# Patient Record
Sex: MALE | Race: WHITE | NOT HISPANIC OR LATINO | URBAN - METROPOLITAN AREA
[De-identification: names, ages, dates, MRNs, and addresses within clinical notes are randomized per-mention and may not be internally consistent; named-entity substitution may affect disease eponyms.]

---

## 2020-08-04 ENCOUNTER — OFFICE VISIT (OUTPATIENT)
Dept: URBAN - METROPOLITAN AREA CLINIC 97 | Facility: CLINIC | Age: 50
End: 2020-08-04
Payer: COMMERCIAL

## 2020-08-04 DIAGNOSIS — K51.80 OTHER ULCERATIVE COLITIS WITHOUT COMPLICATION: ICD-10-CM

## 2020-08-04 PROCEDURE — 96413 CHEMO IV INFUSION 1 HR: CPT | Performed by: INTERNAL MEDICINE

## 2020-08-04 PROCEDURE — 96415 CHEMO IV INFUSION ADDL HR: CPT | Performed by: INTERNAL MEDICINE

## 2020-08-04 RX ORDER — INFLIXIMAB 100 MG/10ML
INJECTION, POWDER, LYOPHILIZED, FOR SOLUTION INTRAVENOUS
Qty: 0 | Refills: 0 | Status: ACTIVE | COMMUNITY
Start: 1900-01-01 | End: 1900-01-01

## 2020-08-04 RX ORDER — ADALIMUMAB 40MG/0.8ML
INJECT 0.8 MILLILITER BY SUBCUTANEOUS ROUTE EVERY 2 WEEKS FOR 90 DAYS KIT SUBCUTANEOUS
Qty: 6 | Refills: 1 | Status: ACTIVE | COMMUNITY
Start: 2017-10-18 | End: 1900-01-01

## 2020-08-04 RX ORDER — FAMOTIDINE 20 MG/1
TAKE 1 TABLET (20 MG) BY ORAL ROUTE ONCE DAILY AT BEDTIME FOR 30 DAYS TABLET, FILM COATED ORAL 1
Qty: 30 | Refills: 2 | Status: ACTIVE | COMMUNITY
Start: 2017-08-11 | End: 1900-01-01

## 2020-09-14 ENCOUNTER — OFFICE VISIT (OUTPATIENT)
Dept: URBAN - METROPOLITAN AREA CLINIC 79 | Facility: CLINIC | Age: 50
End: 2020-09-14

## 2020-09-17 ENCOUNTER — OFFICE VISIT (OUTPATIENT)
Dept: URBAN - METROPOLITAN AREA CLINIC 79 | Facility: CLINIC | Age: 50
End: 2020-09-17

## 2020-09-29 ENCOUNTER — TELEPHONE ENCOUNTER (OUTPATIENT)
Dept: URBAN - METROPOLITAN AREA CLINIC 18 | Facility: CLINIC | Age: 50
End: 2020-09-29

## 2020-09-29 ENCOUNTER — OFFICE VISIT (OUTPATIENT)
Dept: URBAN - METROPOLITAN AREA CLINIC 18 | Facility: CLINIC | Age: 50
End: 2020-09-29
Payer: COMMERCIAL

## 2020-09-29 DIAGNOSIS — K51.80 CHRONIC PANCOLONIC ULCERATIVE COLITIS: ICD-10-CM

## 2020-09-29 PROCEDURE — 96413 CHEMO IV INFUSION 1 HR: CPT | Performed by: INTERNAL MEDICINE

## 2020-09-29 PROCEDURE — 96415 CHEMO IV INFUSION ADDL HR: CPT | Performed by: INTERNAL MEDICINE

## 2020-09-29 RX ORDER — FAMOTIDINE 20 MG/1
TAKE 1 TABLET (20 MG) BY ORAL ROUTE ONCE DAILY AT BEDTIME FOR 30 DAYS TABLET, FILM COATED ORAL 1
Qty: 30 | Refills: 2 | Status: ACTIVE | COMMUNITY
Start: 2017-08-11 | End: 1900-01-01

## 2020-09-29 RX ORDER — ADALIMUMAB 40MG/0.8ML
INJECT 0.8 MILLILITER BY SUBCUTANEOUS ROUTE EVERY 2 WEEKS FOR 90 DAYS KIT SUBCUTANEOUS
Qty: 6 | Refills: 1 | Status: ACTIVE | COMMUNITY
Start: 2017-10-18 | End: 1900-01-01

## 2020-09-29 RX ORDER — INFLIXIMAB 100 MG/10ML
INJECTION, POWDER, LYOPHILIZED, FOR SOLUTION INTRAVENOUS
Qty: 0 | Refills: 0 | Status: ACTIVE | COMMUNITY
Start: 1900-01-01 | End: 1900-01-01

## 2020-09-30 ENCOUNTER — CLAIMS CREATED FROM THE CLAIM WINDOW (OUTPATIENT)
Dept: URBAN - METROPOLITAN AREA CLINIC 19 | Facility: CLINIC | Age: 50
End: 2020-09-30
Payer: COMMERCIAL

## 2020-09-30 ENCOUNTER — WEB ENCOUNTER (OUTPATIENT)
Dept: URBAN - METROPOLITAN AREA CLINIC 19 | Facility: CLINIC | Age: 50
End: 2020-09-30

## 2020-09-30 DIAGNOSIS — E55.9 VITAMIN D DEFICIENCY: ICD-10-CM

## 2020-09-30 DIAGNOSIS — K51.00 ULCERATIVE PANCOLITIS WITHOUT COMPLICATION: ICD-10-CM

## 2020-09-30 PROCEDURE — 82306 VITAMIN D 25 HYDROXY: CPT | Performed by: INTERNAL MEDICINE

## 2020-09-30 PROCEDURE — 99213 OFFICE O/P EST LOW 20 MIN: CPT | Performed by: INTERNAL MEDICINE

## 2020-09-30 RX ORDER — ADALIMUMAB 40MG/0.8ML
INJECT 0.8 MILLILITER BY SUBCUTANEOUS ROUTE EVERY 2 WEEKS FOR 90 DAYS KIT SUBCUTANEOUS
Qty: 6 | Refills: 1 | Status: ACTIVE | COMMUNITY
Start: 2017-10-18 | End: 1900-01-01

## 2020-09-30 RX ORDER — FAMOTIDINE 20 MG/1
TAKE 1 TABLET (20 MG) BY ORAL ROUTE ONCE DAILY AT BEDTIME FOR 30 DAYS TABLET, FILM COATED ORAL 1
Qty: 30 | Refills: 2 | Status: ACTIVE | COMMUNITY
Start: 2017-08-11 | End: 1900-01-01

## 2020-09-30 RX ORDER — SODIUM, POTASSIUM,MAG SULFATES 17.5-3.13G
254 ML SOLUTION, RECONSTITUTED, ORAL ORAL ONCE
Qty: 1 KIT | Refills: 0 | OUTPATIENT
Start: 2020-09-30 | End: 2020-10-01

## 2020-09-30 RX ORDER — INFLIXIMAB 100 MG/10ML
INJECTION, POWDER, LYOPHILIZED, FOR SOLUTION INTRAVENOUS
Qty: 0 | Refills: 0 | Status: ACTIVE | COMMUNITY
Start: 1900-01-01 | End: 1900-01-01

## 2020-09-30 NOTE — HPI-TODAY'S VISIT:
10/18/17: Patient's repeat C. difficile testing on 9/7/17 was negative. He did not undergo an EGD, but he feels better from that standpoint. He is still having 2-3 loose stools/day without blood or mucus. No cramping. We discussed increasing his Humira to weekly dosing for better control of symptoms. He also complains of hand and leg stiffness but not actual joint pain or swelling. This is new.   4/11/18: Patient returns to my clinic for reevaluation of his ulcerative colitis. Because of upper respiratory infections (for which he eventually got antibiotics), he has been unable to schedule his repeat colonoscopy to evaluate for mucosal healing. In February 2018, he received a Z-david followed by cefdinir along with prednisone. He apparently had a reaction to one of the antibiotics causing abdominal cramping and a rash. His stools are still loose but non-bloody; he has 2-3 BMs/day. Has episodic palpitations and night sweats. Still taking Enteragam/VSL #3 and is tapering off his prednisone. Of note, he had an episode of atrial fibrillation that spontaneously resolved - he has been evaluated by cardiology. No need for anticoagulation. Labs from 4/9/18 were reviewed: Hgb 8.0/Hct 27.8 (MCV 65), Plt 824, ESR 30, CRP 40.1, vit D 16.3 6/26/18: Patient returns for follow-up after receiving his induction doses of Remicade. Feeling more energy. Less bowel movements (still loose). No cramping. No blood.  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 Patient feels well today after receiving his Remicade infusion recently. Minimal cramping. No blood. Stools not quite formed ([place] 5 to 7, closer to 5) but only 2-3 BMs/day. Has some stiffness of hands and back but no true arthritis.   1/30/19: Patient returns for follow-up. He has a sinus infection and finally went to his ENT after symptoms had been unimproved for over a month. He is currently on Medrol and antibiotics - his sinuses are better. No apparent GI problems - he has 1 BM/day of relatively normal consistency. No blood. Still on Remicade - holding that while he treats his sinus infection. Will restart when feeling better. On vitamin D and iron.   5/28/19: Patient returns for follow-up of UC. Restarted his Remicade infusions - last one done on 4/25/19. Still takes VSL #3 DS. Doing well - has one BM/day. No blood or cramping. Only concerns are some inconsistency with the stool (somewhat loose) and dyspnea when climbing stairs. He has gained weight - suspect some element of deconditioning.   6/12/19:  pt comes in for recent hematochezia with dropping hct.   notes recent issues with brown stool with brb on it.  he also notes labs done 2 weeks ago with hgb down from 12.9 --> 12.3.  he notes mostwly normal stools with no urgency.  he has 1 bm every 1-2 days.  he notes some brb on the stool. no abd pain.  no rectal pain.  no n/v.  good appetite.  no weight loss.  no f/c.  notes some thinner stools.  no other complaints.   8/8/19: Patient presents for follow-up. He is concerned about anemia - his ferritin was 9 when checked by Dr. Jack. Iron infusion was ordered, and he received his first infusion last week and another one this week. Has some dyspnea when he walks up stairs or gets up from lying/sitting down. Has gained some weight - suspect some deconditioning, but anemia may be playing a small role. He has bouts of loose stools every few weeks - possibly sees small amounts of blood. Overall, doing well.   11/8/19: Patient presents for follow-up of his ulcerative colitis. At his last visit, I checked labs, and his blood counts and iron studies/ferritin were normal (hgb 13.8 g/dL, ferritin > 300). Still has low vitamin D level - on vitamin D supplement. Last colonoscopy was on 4/30/18 - showed severe disease but unchanged. He is doing well today - no pain (except for some gas after eating cabbage). No blood in stool or diarrhea.  3/16/20: Patient presents for follow-up. Doing well on Remicade. He has had 2 to 3 episodes of cramping, but this occurred when he ate a lot of sweets after dieting. He has not seen any blood in stool. He has felt his heart racing, and his HR is 100 today. Will check labs.  9/30/20:  Patient returns for reevaluation of his UC.  He mentioned that he still has episodic cramping after eating a lot of sweets, but this only happened 3 times since the beginning of the year.  No bloody diarrhea or extraintestinal manifestations of his disease.  Overall doing well.

## 2020-10-01 LAB
A/G RATIO: 1.6
ALBUMIN: 4.3
ALKALINE PHOSPHATASE: 92
ALT (SGPT): 61
AST (SGOT): 49
BILIRUBIN, TOTAL: 0.4
BUN/CREATININE RATIO: 15
BUN: 15
CALCIUM: 9.7
CARBON DIOXIDE, TOTAL: 22
CHLORIDE: 103
CREATININE: 0.99
EGFR IF AFRICN AM: 103
EGFR IF NONAFRICN AM: 89
GLOBULIN, TOTAL: 2.7
GLUCOSE: 119
HEMATOCRIT: 42.6
HEMOGLOBIN: 14.1
MCH: 27.3
MCHC: 33.1
MCV: 82
NRBC: (no result)
PLATELETS: 489
POTASSIUM: 4.4
PROTEIN, TOTAL: 7
RBC: 5.17
RDW: 13.8
SODIUM: 138
VITAMIN D, 25-HYDROXY: 26
WBC: 5.2

## 2020-10-04 ENCOUNTER — TELEPHONE ENCOUNTER (OUTPATIENT)
Dept: URBAN - METROPOLITAN AREA CLINIC 18 | Facility: CLINIC | Age: 50
End: 2020-10-04

## 2020-10-04 ENCOUNTER — LAB OUTSIDE AN ENCOUNTER (OUTPATIENT)
Dept: URBAN - METROPOLITAN AREA CLINIC 18 | Facility: CLINIC | Age: 50
End: 2020-10-04

## 2020-11-24 ENCOUNTER — OFFICE VISIT (OUTPATIENT)
Dept: URBAN - METROPOLITAN AREA CLINIC 79 | Facility: CLINIC | Age: 50
End: 2020-11-24
Payer: COMMERCIAL

## 2020-11-24 VITALS
HEIGHT: 73 IN | SYSTOLIC BLOOD PRESSURE: 104 MMHG | WEIGHT: 236 LBS | RESPIRATION RATE: 18 BRPM | BODY MASS INDEX: 31.28 KG/M2 | DIASTOLIC BLOOD PRESSURE: 67 MMHG | HEART RATE: 76 BPM | TEMPERATURE: 98.2 F

## 2020-11-24 DIAGNOSIS — K51.80 CHRONIC PANCOLONIC ULCERATIVE COLITIS: ICD-10-CM

## 2020-11-24 PROCEDURE — 96415 CHEMO IV INFUSION ADDL HR: CPT | Performed by: INTERNAL MEDICINE

## 2020-11-24 PROCEDURE — 96413 CHEMO IV INFUSION 1 HR: CPT | Performed by: INTERNAL MEDICINE

## 2020-11-24 RX ORDER — ADALIMUMAB 40MG/0.8ML
INJECT 0.8 MILLILITER BY SUBCUTANEOUS ROUTE EVERY 2 WEEKS FOR 90 DAYS KIT SUBCUTANEOUS
Qty: 6 | Refills: 1 | Status: ACTIVE | COMMUNITY
Start: 2017-10-18 | End: 1900-01-01

## 2020-11-24 RX ORDER — INFLIXIMAB 100 MG/10ML
INJECTION, POWDER, LYOPHILIZED, FOR SOLUTION INTRAVENOUS
Qty: 0 | Refills: 0 | Status: ACTIVE | COMMUNITY
Start: 1900-01-01 | End: 1900-01-01

## 2020-11-24 RX ORDER — FAMOTIDINE 20 MG/1
TAKE 1 TABLET (20 MG) BY ORAL ROUTE ONCE DAILY AT BEDTIME FOR 30 DAYS TABLET, FILM COATED ORAL 1
Qty: 30 | Refills: 2 | Status: ACTIVE | COMMUNITY
Start: 2017-08-11 | End: 1900-01-01

## 2020-12-16 ENCOUNTER — OFFICE VISIT (OUTPATIENT)
Dept: URBAN - METROPOLITAN AREA SURGERY CENTER 31 | Facility: SURGERY CENTER | Age: 50
End: 2020-12-16

## 2021-01-13 ENCOUNTER — OFFICE VISIT (OUTPATIENT)
Dept: URBAN - METROPOLITAN AREA SURGERY CENTER 31 | Facility: SURGERY CENTER | Age: 51
End: 2021-01-13

## 2021-01-19 ENCOUNTER — OFFICE VISIT (OUTPATIENT)
Dept: URBAN - METROPOLITAN AREA CLINIC 79 | Facility: CLINIC | Age: 51
End: 2021-01-19
Payer: COMMERCIAL

## 2021-01-19 VITALS
BODY MASS INDEX: 29.95 KG/M2 | RESPIRATION RATE: 18 BRPM | TEMPERATURE: 97.5 F | WEIGHT: 226 LBS | DIASTOLIC BLOOD PRESSURE: 66 MMHG | HEIGHT: 73 IN | SYSTOLIC BLOOD PRESSURE: 113 MMHG | HEART RATE: 83 BPM

## 2021-01-19 DIAGNOSIS — K51.80 CHRONIC PANCOLONIC ULCERATIVE COLITIS: ICD-10-CM

## 2021-01-19 PROCEDURE — 96415 CHEMO IV INFUSION ADDL HR: CPT | Performed by: INTERNAL MEDICINE

## 2021-01-19 PROCEDURE — 96413 CHEMO IV INFUSION 1 HR: CPT | Performed by: INTERNAL MEDICINE

## 2021-01-19 RX ORDER — FAMOTIDINE 20 MG/1
TAKE 1 TABLET (20 MG) BY ORAL ROUTE ONCE DAILY AT BEDTIME FOR 30 DAYS TABLET, FILM COATED ORAL 1
Qty: 30 | Refills: 2 | Status: ACTIVE | COMMUNITY
Start: 2017-08-11 | End: 1900-01-01

## 2021-01-19 RX ORDER — ADALIMUMAB 40MG/0.8ML
INJECT 0.8 MILLILITER BY SUBCUTANEOUS ROUTE EVERY 2 WEEKS FOR 90 DAYS KIT SUBCUTANEOUS
Qty: 6 | Refills: 1 | Status: ACTIVE | COMMUNITY
Start: 2017-10-18 | End: 1900-01-01

## 2021-01-19 RX ORDER — INFLIXIMAB 100 MG/10ML
INJECTION, POWDER, LYOPHILIZED, FOR SOLUTION INTRAVENOUS
Qty: 0 | Refills: 0 | Status: ACTIVE | COMMUNITY
Start: 1900-01-01 | End: 1900-01-01

## 2021-03-09 ENCOUNTER — OFFICE VISIT (OUTPATIENT)
Dept: URBAN - METROPOLITAN AREA CLINIC 80 | Facility: CLINIC | Age: 51
End: 2021-03-09

## 2021-03-09 RX ORDER — INFLIXIMAB 100 MG/10ML
INJECTION, POWDER, LYOPHILIZED, FOR SOLUTION INTRAVENOUS
Qty: 0 | Refills: 0 | Status: ACTIVE | COMMUNITY
Start: 1900-01-01

## 2021-03-09 RX ORDER — FAMOTIDINE 20 MG/1
TAKE 1 TABLET (20 MG) BY ORAL ROUTE ONCE DAILY AT BEDTIME FOR 30 DAYS TABLET, FILM COATED ORAL 1
Qty: 30 | Refills: 2 | Status: ACTIVE | COMMUNITY
Start: 2017-08-11

## 2021-03-09 RX ORDER — ADALIMUMAB 40MG/0.8ML
INJECT 0.8 MILLILITER BY SUBCUTANEOUS ROUTE EVERY 2 WEEKS FOR 90 DAYS KIT SUBCUTANEOUS
Qty: 6 | Refills: 1 | Status: ACTIVE | COMMUNITY
Start: 2017-10-18

## 2021-03-18 ENCOUNTER — OFFICE VISIT (OUTPATIENT)
Dept: URBAN - METROPOLITAN AREA CLINIC 79 | Facility: CLINIC | Age: 51
End: 2021-03-18
Payer: COMMERCIAL

## 2021-03-18 VITALS
DIASTOLIC BLOOD PRESSURE: 72 MMHG | HEIGHT: 73 IN | TEMPERATURE: 98.1 F | WEIGHT: 225 LBS | BODY MASS INDEX: 29.82 KG/M2 | SYSTOLIC BLOOD PRESSURE: 104 MMHG | RESPIRATION RATE: 20 BRPM | HEART RATE: 78 BPM

## 2021-03-18 DIAGNOSIS — K51.80 CHRONIC PANCOLONIC ULCERATIVE COLITIS: ICD-10-CM

## 2021-03-18 PROCEDURE — 96415 CHEMO IV INFUSION ADDL HR: CPT | Performed by: INTERNAL MEDICINE

## 2021-03-18 PROCEDURE — 96413 CHEMO IV INFUSION 1 HR: CPT | Performed by: INTERNAL MEDICINE

## 2021-03-18 RX ORDER — FAMOTIDINE 20 MG/1
TAKE 1 TABLET (20 MG) BY ORAL ROUTE ONCE DAILY AT BEDTIME FOR 30 DAYS TABLET, FILM COATED ORAL 1
Qty: 30 | Refills: 2 | Status: ACTIVE | COMMUNITY
Start: 2017-08-11

## 2021-03-18 RX ORDER — ADALIMUMAB 40MG/0.8ML
INJECT 0.8 MILLILITER BY SUBCUTANEOUS ROUTE EVERY 2 WEEKS FOR 90 DAYS KIT SUBCUTANEOUS
Qty: 6 | Refills: 1 | Status: ACTIVE | COMMUNITY
Start: 2017-10-18

## 2021-03-18 RX ORDER — INFLIXIMAB 100 MG/10ML
INJECTION, POWDER, LYOPHILIZED, FOR SOLUTION INTRAVENOUS
Qty: 0 | Refills: 0 | Status: ACTIVE | COMMUNITY
Start: 1900-01-01

## 2021-05-21 ENCOUNTER — OFFICE VISIT (OUTPATIENT)
Dept: URBAN - METROPOLITAN AREA CLINIC 79 | Facility: CLINIC | Age: 51
End: 2021-05-21

## 2021-06-09 ENCOUNTER — OFFICE VISIT (OUTPATIENT)
Dept: URBAN - METROPOLITAN AREA CLINIC 91 | Facility: CLINIC | Age: 51
End: 2021-06-09
Payer: COMMERCIAL

## 2021-06-09 VITALS
RESPIRATION RATE: 16 BRPM | SYSTOLIC BLOOD PRESSURE: 128 MMHG | BODY MASS INDEX: 29.79 KG/M2 | DIASTOLIC BLOOD PRESSURE: 94 MMHG | HEIGHT: 73 IN | WEIGHT: 224.8 LBS | HEART RATE: 82 BPM | TEMPERATURE: 98.2 F

## 2021-06-09 DIAGNOSIS — K51.80 CHRONIC PANCOLONIC ULCERATIVE COLITIS: ICD-10-CM

## 2021-06-09 PROCEDURE — 96415 CHEMO IV INFUSION ADDL HR: CPT | Performed by: INTERNAL MEDICINE

## 2021-06-09 PROCEDURE — 96413 CHEMO IV INFUSION 1 HR: CPT | Performed by: INTERNAL MEDICINE

## 2021-06-09 RX ORDER — FAMOTIDINE 20 MG/1
TAKE 1 TABLET (20 MG) BY ORAL ROUTE ONCE DAILY AT BEDTIME FOR 30 DAYS TABLET, FILM COATED ORAL 1
Qty: 30 | Refills: 2 | Status: ACTIVE | COMMUNITY
Start: 2017-08-11

## 2021-06-09 RX ORDER — ADALIMUMAB 40MG/0.8ML
INJECT 0.8 MILLILITER BY SUBCUTANEOUS ROUTE EVERY 2 WEEKS FOR 90 DAYS KIT SUBCUTANEOUS
Qty: 6 | Refills: 1 | Status: ACTIVE | COMMUNITY
Start: 2017-10-18

## 2021-06-09 RX ORDER — INFLIXIMAB 100 MG/10ML
INJECTION, POWDER, LYOPHILIZED, FOR SOLUTION INTRAVENOUS
Qty: 0 | Refills: 0 | Status: ACTIVE | COMMUNITY
Start: 1900-01-01

## 2021-07-16 ENCOUNTER — OFFICE VISIT (OUTPATIENT)
Dept: URBAN - METROPOLITAN AREA CLINIC 79 | Facility: CLINIC | Age: 51
End: 2021-07-16

## 2021-07-28 ENCOUNTER — TELEPHONE ENCOUNTER (OUTPATIENT)
Dept: URBAN - METROPOLITAN AREA CLINIC 19 | Facility: CLINIC | Age: 51
End: 2021-07-28

## 2021-08-03 ENCOUNTER — TELEPHONE ENCOUNTER (OUTPATIENT)
Dept: URBAN - METROPOLITAN AREA CLINIC 23 | Facility: CLINIC | Age: 51
End: 2021-08-03

## 2021-08-05 ENCOUNTER — OFFICE VISIT (OUTPATIENT)
Dept: URBAN - METROPOLITAN AREA CLINIC 79 | Facility: CLINIC | Age: 51
End: 2021-08-05
Payer: COMMERCIAL

## 2021-08-05 VITALS
RESPIRATION RATE: 18 BRPM | HEIGHT: 73 IN | DIASTOLIC BLOOD PRESSURE: 90 MMHG | WEIGHT: 225 LBS | TEMPERATURE: 98.1 F | SYSTOLIC BLOOD PRESSURE: 122 MMHG | BODY MASS INDEX: 29.82 KG/M2 | HEART RATE: 81 BPM

## 2021-08-05 DIAGNOSIS — K51.80 CHRONIC PANCOLONIC ULCERATIVE COLITIS: ICD-10-CM

## 2021-08-05 PROCEDURE — 96415 CHEMO IV INFUSION ADDL HR: CPT | Performed by: INTERNAL MEDICINE

## 2021-08-05 PROCEDURE — 96413 CHEMO IV INFUSION 1 HR: CPT | Performed by: INTERNAL MEDICINE

## 2021-08-05 RX ORDER — FAMOTIDINE 20 MG/1
TAKE 1 TABLET (20 MG) BY ORAL ROUTE ONCE DAILY AT BEDTIME FOR 30 DAYS TABLET, FILM COATED ORAL 1
Qty: 30 | Refills: 2 | Status: ACTIVE | COMMUNITY
Start: 2017-08-11

## 2021-08-05 RX ORDER — INFLIXIMAB 100 MG/10ML
INJECTION, POWDER, LYOPHILIZED, FOR SOLUTION INTRAVENOUS
Qty: 0 | Refills: 0 | Status: ACTIVE | COMMUNITY
Start: 1900-01-01

## 2021-08-05 RX ORDER — ADALIMUMAB 40MG/0.8ML
INJECT 0.8 MILLILITER BY SUBCUTANEOUS ROUTE EVERY 2 WEEKS FOR 90 DAYS KIT SUBCUTANEOUS
Qty: 6 | Refills: 1 | Status: ACTIVE | COMMUNITY
Start: 2017-10-18

## 2021-08-10 ENCOUNTER — OFFICE VISIT (OUTPATIENT)
Dept: URBAN - METROPOLITAN AREA SURGERY CENTER 31 | Facility: SURGERY CENTER | Age: 51
End: 2021-08-10
Payer: COMMERCIAL

## 2021-08-10 DIAGNOSIS — K60.2 ACUTE ANAL FISSURE: ICD-10-CM

## 2021-08-10 DIAGNOSIS — K51.018 CHRONIC ULCERATIVE ENTEROCOLITIS WITH OTHER COMPLICATION: ICD-10-CM

## 2021-08-10 PROCEDURE — 45380 COLONOSCOPY AND BIOPSY: CPT | Performed by: INTERNAL MEDICINE

## 2021-08-10 PROCEDURE — G8907 PT DOC NO EVENTS ON DISCHARG: HCPCS | Performed by: INTERNAL MEDICINE

## 2021-08-10 RX ORDER — FAMOTIDINE 20 MG/1
TAKE 1 TABLET (20 MG) BY ORAL ROUTE ONCE DAILY AT BEDTIME FOR 30 DAYS TABLET, FILM COATED ORAL 1
Qty: 30 | Refills: 2 | Status: ACTIVE | COMMUNITY
Start: 2017-08-11

## 2021-08-10 RX ORDER — ADALIMUMAB 40MG/0.8ML
INJECT 0.8 MILLILITER BY SUBCUTANEOUS ROUTE EVERY 2 WEEKS FOR 90 DAYS KIT SUBCUTANEOUS
Qty: 6 | Refills: 1 | Status: ACTIVE | COMMUNITY
Start: 2017-10-18

## 2021-08-10 RX ORDER — INFLIXIMAB 100 MG/10ML
INJECTION, POWDER, LYOPHILIZED, FOR SOLUTION INTRAVENOUS
Qty: 0 | Refills: 0 | Status: ACTIVE | COMMUNITY
Start: 1900-01-01

## 2021-08-30 ENCOUNTER — TELEPHONE ENCOUNTER (OUTPATIENT)
Dept: URBAN - METROPOLITAN AREA CLINIC 19 | Facility: CLINIC | Age: 51
End: 2021-08-30

## 2021-10-01 ENCOUNTER — OFFICE VISIT (OUTPATIENT)
Dept: URBAN - METROPOLITAN AREA CLINIC 19 | Facility: CLINIC | Age: 51
End: 2021-10-01
Payer: COMMERCIAL

## 2021-10-01 VITALS
TEMPERATURE: 98.3 F | SYSTOLIC BLOOD PRESSURE: 120 MMHG | HEART RATE: 78 BPM | DIASTOLIC BLOOD PRESSURE: 80 MMHG | BODY MASS INDEX: 30.48 KG/M2 | HEIGHT: 73 IN | WEIGHT: 230 LBS

## 2021-10-01 DIAGNOSIS — E55.9 VITAMIN D DEFICIENCY: ICD-10-CM

## 2021-10-01 DIAGNOSIS — K51.00 ULCERATIVE PANCOLITIS WITHOUT COMPLICATION: ICD-10-CM

## 2021-10-01 DIAGNOSIS — K64.8 INTERNAL HEMORRHOIDS: ICD-10-CM

## 2021-10-01 PROBLEM — 90458007 INTERNAL HEMORRHOIDS: Status: ACTIVE | Noted: 2021-10-01

## 2021-10-01 PROCEDURE — 99214 OFFICE O/P EST MOD 30 MIN: CPT | Performed by: INTERNAL MEDICINE

## 2021-10-01 RX ORDER — INFLIXIMAB 100 MG/10ML
INJECTION, POWDER, LYOPHILIZED, FOR SOLUTION INTRAVENOUS
Qty: 0 | Refills: 0 | Status: ACTIVE | COMMUNITY
Start: 1900-01-01

## 2021-10-01 RX ORDER — HYDROCORTISONE ACETATE 25 MG/1
1 SUPPOSITORY SUPPOSITORY RECTAL QHS
Qty: 14 | Refills: 1 | OUTPATIENT
Start: 2021-10-01 | End: 2021-10-29

## 2021-10-01 RX ORDER — FAMOTIDINE 20 MG/1
TAKE 1 TABLET (20 MG) BY ORAL ROUTE ONCE DAILY AT BEDTIME FOR 30 DAYS TABLET, FILM COATED ORAL 1
Qty: 30 | Refills: 2 | Status: ACTIVE | COMMUNITY
Start: 2017-08-11

## 2021-10-01 RX ORDER — ADALIMUMAB 40MG/0.8ML
INJECT 0.8 MILLILITER BY SUBCUTANEOUS ROUTE EVERY 2 WEEKS FOR 90 DAYS KIT SUBCUTANEOUS
Qty: 6 | Refills: 1 | Status: ACTIVE | COMMUNITY
Start: 2017-10-18

## 2021-10-01 NOTE — HPI-TODAY'S VISIT:
10/18/17: Patient's repeat C. difficile testing on 9/7/17 was negative. He did not undergo an EGD, but he feels better from that standpoint. He is still having 2-3 loose stools/day without blood or mucus. No cramping. We discussed increasing his Humira to weekly dosing for better control of symptoms. He also complains of hand and leg stiffness but not actual joint pain or swelling. This is new.   4/11/18: Patient returns to my clinic for reevaluation of his ulcerative colitis. Because of upper respiratory infections (for which he eventually got antibiotics), he has been unable to schedule his repeat colonoscopy to evaluate for mucosal healing. In February 2018, he received a Z-david followed by cefdinir along with prednisone. He apparently had a reaction to one of the antibiotics causing abdominal cramping and a rash. His stools are still loose but non-bloody; he has 2-3 BMs/day. Has episodic palpitations and night sweats. Still taking Enteragam/VSL #3 and is tapering off his prednisone. Of note, he had an episode of atrial fibrillation that spontaneously resolved - he has been evaluated by cardiology. No need for anticoagulation. Labs from 4/9/18 were reviewed: Hgb 8.0/Hct 27.8 (MCV 65), Plt 824, ESR 30, CRP 40.1, vit D 16.3 6/26/18: Patient returns for follow-up after receiving his induction doses of Remicade. Feeling more energy. Less bowel movements (still loose). No cramping. No blood.  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 Patient feels well today after receiving his Remicade infusion recently. Minimal cramping. No blood. Stools not quite formed ([place] 5 to 7, closer to 5) but only 2-3 BMs/day. Has some stiffness of hands and back but no true arthritis.   1/30/19: Patient returns for follow-up. He has a sinus infection and finally went to his ENT after symptoms had been unimproved for over a month. He is currently on Medrol and antibiotics - his sinuses are better. No apparent GI problems - he has 1 BM/day of relatively normal consistency. No blood. Still on Remicade - holding that while he treats his sinus infection. Will restart when feeling better. On vitamin D and iron.   5/28/19: Patient returns for follow-up of UC. Restarted his Remicade infusions - last one done on 4/25/19. Still takes VSL #3 DS. Doing well - has one BM/day. No blood or cramping. Only concerns are some inconsistency with the stool (somewhat loose) and dyspnea when climbing stairs. He has gained weight - suspect some element of deconditioning.   6/12/19:  pt comes in for recent hematochezia with dropping hct.   notes recent issues with brown stool with brb on it.  he also notes labs done 2 weeks ago with hgb down from 12.9 --> 12.3.  he notes mostwly normal stools with no urgency.  he has 1 bm every 1-2 days.  he notes some brb on the stool. no abd pain.  no rectal pain.  no n/v.  good appetite.  no weight loss.  no f/c.  notes some thinner stools.  no other complaints.   8/8/19: Patient presents for follow-up. He is concerned about anemia - his ferritin was 9 when checked by Dr. Jack. Iron infusion was ordered, and he received his first infusion last week and another one this week. Has some dyspnea when he walks up stairs or gets up from lying/sitting down. Has gained some weight - suspect some deconditioning, but anemia may be playing a small role. He has bouts of loose stools every few weeks - possibly sees small amounts of blood. Overall, doing well.   11/8/19: Patient presents for follow-up of his ulcerative colitis. At his last visit, I checked labs, and his blood counts and iron studies/ferritin were normal (hgb 13.8 g/dL, ferritin > 300). Still has low vitamin D level - on vitamin D supplement. Last colonoscopy was on 4/30/18 - showed severe disease but unchanged. He is doing well today - no pain (except for some gas after eating cabbage). No blood in stool or diarrhea.  3/16/20: Patient presents for follow-up. Doing well on Remicade. He has had 2 to 3 episodes of cramping, but this occurred when he ate a lot of sweets after dieting. He has not seen any blood in stool. He has felt his heart racing, and his HR is 100 today. Will check labs.  9/30/20:  Patient returns for reevaluation of his UC.  He mentioned that he still has episodic cramping after eating a lot of sweets, but this only happened 3 times since the beginning of the year.  No bloody diarrhea or extraintestinal manifestations of his disease.  Overall doing well.  10/1/21:  Patient presents for reevaluation of is ulcerative pancolitis.  Doing well on Remicade - last infusion was on 8/5/21.  I performed a colonoscopy on 8/10/21 that showed lots of pseudopolyps but no significant inflammation.  He has 1-2 solid bowel movements daily without blood.  He has some anal discomfort for which he uses Preparation PAUL Gutierrez noted.

## 2021-10-02 LAB
A/G RATIO: 1.6
ALBUMIN: 4.4
ALKALINE PHOSPHATASE: 88
ALT (SGPT): 22
AST (SGOT): 22
BILIRUBIN, TOTAL: 0.3
BUN/CREATININE RATIO: 21
BUN: 20
C-REACTIVE PROTEIN, QUANT: 2
CALCIUM: 9.2
CARBON DIOXIDE, TOTAL: 25
CHLORIDE: 103
CREATININE: 0.95
EGFR IF AFRICN AM: 107
EGFR IF NONAFRICN AM: 93
GLOBULIN, TOTAL: 2.7
GLUCOSE: 93
HEMATOCRIT: 42.7
HEMOGLOBIN: 13.6
MCH: 25.3
MCHC: 31.9
MCV: 80
NRBC: (no result)
PLATELETS: 340
POTASSIUM: 4.1
PROTEIN, TOTAL: 7.1
RBC: 5.37
RDW: 15.7
SEDIMENTATION RATE-WESTERGREN: 15
SODIUM: 141
VITAMIN D, 25-HYDROXY: 20.1
WBC: 5

## 2021-10-04 ENCOUNTER — OFFICE VISIT (OUTPATIENT)
Dept: URBAN - METROPOLITAN AREA CLINIC 18 | Facility: CLINIC | Age: 51
End: 2021-10-04
Payer: COMMERCIAL

## 2021-10-04 ENCOUNTER — TELEPHONE ENCOUNTER (OUTPATIENT)
Dept: URBAN - METROPOLITAN AREA CLINIC 18 | Facility: CLINIC | Age: 51
End: 2021-10-04

## 2021-10-04 DIAGNOSIS — K51.80 CHRONIC PANCOLONIC ULCERATIVE COLITIS: ICD-10-CM

## 2021-10-04 PROCEDURE — 96415 CHEMO IV INFUSION ADDL HR: CPT | Performed by: INTERNAL MEDICINE

## 2021-10-04 PROCEDURE — 96413 CHEMO IV INFUSION 1 HR: CPT | Performed by: INTERNAL MEDICINE

## 2021-10-04 RX ORDER — FAMOTIDINE 20 MG/1
TAKE 1 TABLET (20 MG) BY ORAL ROUTE ONCE DAILY AT BEDTIME FOR 30 DAYS TABLET, FILM COATED ORAL 1
Qty: 30 | Refills: 2 | Status: ACTIVE | COMMUNITY
Start: 2017-08-11

## 2021-10-04 RX ORDER — INFLIXIMAB 100 MG/10ML
INJECTION, POWDER, LYOPHILIZED, FOR SOLUTION INTRAVENOUS
Qty: 0 | Refills: 0 | Status: ACTIVE | COMMUNITY
Start: 1900-01-01

## 2021-10-04 RX ORDER — HYDROCORTISONE ACETATE 25 MG/1
1 SUPPOSITORY SUPPOSITORY RECTAL QHS
Qty: 14 | Refills: 1 | Status: ACTIVE | COMMUNITY
Start: 2021-10-01 | End: 2021-10-29

## 2021-10-04 RX ORDER — ADALIMUMAB 40MG/0.8ML
INJECT 0.8 MILLILITER BY SUBCUTANEOUS ROUTE EVERY 2 WEEKS FOR 90 DAYS KIT SUBCUTANEOUS
Qty: 6 | Refills: 1 | Status: ACTIVE | COMMUNITY
Start: 2017-10-18

## 2021-11-29 ENCOUNTER — OFFICE VISIT (OUTPATIENT)
Dept: URBAN - METROPOLITAN AREA CLINIC 18 | Facility: CLINIC | Age: 51
End: 2021-11-29

## 2021-12-02 ENCOUNTER — TELEPHONE ENCOUNTER (OUTPATIENT)
Dept: URBAN - METROPOLITAN AREA CLINIC 19 | Facility: CLINIC | Age: 51
End: 2021-12-02

## 2021-12-14 LAB
QUANTIFERON CRITERIA: (no result)
QUANTIFERON INCUBATION: (no result)
QUANTIFERON MITOGEN VALUE: 1.59
QUANTIFERON NIL VALUE: 0.02
QUANTIFERON TB1 AG VALUE: 0.03
QUANTIFERON TB2 AG VALUE: 0.03
QUANTIFERON-TB GOLD PLUS: NEGATIVE

## 2021-12-21 ENCOUNTER — OFFICE VISIT (OUTPATIENT)
Dept: URBAN - METROPOLITAN AREA CLINIC 79 | Facility: CLINIC | Age: 51
End: 2021-12-21

## 2021-12-23 ENCOUNTER — OFFICE VISIT (OUTPATIENT)
Dept: URBAN - METROPOLITAN AREA CLINIC 79 | Facility: CLINIC | Age: 51
End: 2021-12-23
Payer: COMMERCIAL

## 2021-12-23 VITALS
BODY MASS INDEX: 32.07 KG/M2 | HEIGHT: 73 IN | HEART RATE: 74 BPM | DIASTOLIC BLOOD PRESSURE: 82 MMHG | SYSTOLIC BLOOD PRESSURE: 125 MMHG | WEIGHT: 242 LBS | TEMPERATURE: 98.4 F | RESPIRATION RATE: 18 BRPM

## 2021-12-23 DIAGNOSIS — K51.80 CHRONIC PANCOLONIC ULCERATIVE COLITIS: ICD-10-CM

## 2021-12-23 PROCEDURE — 96413 CHEMO IV INFUSION 1 HR: CPT | Performed by: INTERNAL MEDICINE

## 2021-12-23 PROCEDURE — 96415 CHEMO IV INFUSION ADDL HR: CPT | Performed by: INTERNAL MEDICINE

## 2021-12-23 RX ORDER — ADALIMUMAB 40MG/0.8ML
INJECT 0.8 MILLILITER BY SUBCUTANEOUS ROUTE EVERY 2 WEEKS FOR 90 DAYS KIT SUBCUTANEOUS
Qty: 6 | Refills: 1 | Status: ACTIVE | COMMUNITY
Start: 2017-10-18

## 2021-12-23 RX ORDER — FAMOTIDINE 20 MG/1
TAKE 1 TABLET (20 MG) BY ORAL ROUTE ONCE DAILY AT BEDTIME FOR 30 DAYS TABLET, FILM COATED ORAL 1
Qty: 30 | Refills: 2 | Status: ACTIVE | COMMUNITY
Start: 2017-08-11

## 2021-12-23 RX ORDER — INFLIXIMAB 100 MG/10ML
INJECTION, POWDER, LYOPHILIZED, FOR SOLUTION INTRAVENOUS
Qty: 0 | Refills: 0 | Status: ACTIVE | COMMUNITY
Start: 1900-01-01

## 2022-02-10 ENCOUNTER — TELEPHONE ENCOUNTER (OUTPATIENT)
Dept: URBAN - METROPOLITAN AREA CLINIC 18 | Facility: CLINIC | Age: 52
End: 2022-02-10

## 2022-02-10 RX ORDER — INFLIXIMAB 100 MG/10ML
AS DIRECTED INJECTION, POWDER, LYOPHILIZED, FOR SOLUTION INTRAVENOUS
Qty: 100 MILLIGRAMS | Refills: 6 | OUTPATIENT

## 2022-02-21 ENCOUNTER — OFFICE VISIT (OUTPATIENT)
Dept: URBAN - METROPOLITAN AREA CLINIC 79 | Facility: CLINIC | Age: 52
End: 2022-02-21

## 2022-03-18 ENCOUNTER — OFFICE VISIT (OUTPATIENT)
Dept: URBAN - METROPOLITAN AREA CLINIC 79 | Facility: CLINIC | Age: 52
End: 2022-03-18

## 2022-03-23 ENCOUNTER — TELEPHONE ENCOUNTER (OUTPATIENT)
Dept: URBAN - METROPOLITAN AREA CLINIC 92 | Facility: CLINIC | Age: 52
End: 2022-03-23

## 2022-03-23 ENCOUNTER — TELEPHONE ENCOUNTER (OUTPATIENT)
Dept: URBAN - METROPOLITAN AREA CLINIC 18 | Facility: CLINIC | Age: 52
End: 2022-03-23

## 2022-03-24 ENCOUNTER — OFFICE VISIT (OUTPATIENT)
Dept: URBAN - METROPOLITAN AREA CLINIC 18 | Facility: CLINIC | Age: 52
End: 2022-03-24

## 2022-03-24 RX ORDER — INFLIXIMAB 100 MG/10ML
AS DIRECTED INJECTION, POWDER, LYOPHILIZED, FOR SOLUTION INTRAVENOUS
Qty: 100 MILLIGRAMS | Refills: 6 | Status: ACTIVE | COMMUNITY

## 2022-03-24 RX ORDER — ADALIMUMAB 40MG/0.8ML
INJECT 0.8 MILLILITER BY SUBCUTANEOUS ROUTE EVERY 2 WEEKS FOR 90 DAYS KIT SUBCUTANEOUS
Qty: 6 | Refills: 1 | Status: ACTIVE | COMMUNITY
Start: 2017-10-18

## 2022-03-24 RX ORDER — FAMOTIDINE 20 MG/1
TAKE 1 TABLET (20 MG) BY ORAL ROUTE ONCE DAILY AT BEDTIME FOR 30 DAYS TABLET, FILM COATED ORAL 1
Qty: 30 | Refills: 2 | Status: ACTIVE | COMMUNITY
Start: 2017-08-11

## 2022-03-24 RX ORDER — INFLIXIMAB 100 MG/10ML
INJECTION, POWDER, LYOPHILIZED, FOR SOLUTION INTRAVENOUS
Qty: 0 | Refills: 0 | Status: ACTIVE | COMMUNITY
Start: 1900-01-01

## 2022-04-15 ENCOUNTER — OFFICE VISIT (OUTPATIENT)
Dept: URBAN - METROPOLITAN AREA CLINIC 19 | Facility: CLINIC | Age: 52
End: 2022-04-15

## 2022-04-18 ENCOUNTER — OFFICE VISIT (OUTPATIENT)
Dept: URBAN - METROPOLITAN AREA CLINIC 79 | Facility: CLINIC | Age: 52
End: 2022-04-18

## 2022-04-29 ENCOUNTER — TELEPHONE ENCOUNTER (OUTPATIENT)
Dept: URBAN - METROPOLITAN AREA CLINIC 79 | Facility: CLINIC | Age: 52
End: 2022-04-29

## 2022-04-29 RX ORDER — HYDROCORTISONE SODIUM SUCCINATE 100 MG/2ML
AS DIRECTED INJECTION, POWDER, FOR SOLUTION INTRAMUSCULAR; INTRAVENOUS
Qty: 100 MILLIGRAM | Refills: 0 | OUTPATIENT
Start: 2022-04-29 | End: 2022-04-30

## 2022-04-29 RX ORDER — DIPHENHYDRAMINE HCL 2 %
1 CAPSULE AT BEDTIME AS NEEDED CREAM (GRAM) TOPICAL ONCE A DAY
Qty: 30 | Refills: 0 | OUTPATIENT
Start: 2022-04-29 | End: 2022-05-29

## 2022-05-13 ENCOUNTER — OFFICE VISIT (OUTPATIENT)
Dept: URBAN - METROPOLITAN AREA CLINIC 79 | Facility: CLINIC | Age: 52
End: 2022-05-13

## 2022-05-24 PROBLEM — 64766004 ULCERATIVE COLITIS: Status: ACTIVE | Noted: 2022-05-24

## 2022-06-02 ENCOUNTER — DASHBOARD ENCOUNTERS (OUTPATIENT)
Age: 52
End: 2022-06-02

## 2022-06-02 ENCOUNTER — OFFICE VISIT (OUTPATIENT)
Dept: URBAN - METROPOLITAN AREA CLINIC 128 | Facility: CLINIC | Age: 52
End: 2022-06-02
Payer: COMMERCIAL

## 2022-06-02 DIAGNOSIS — K51.00 ULCERATIVE PANCOLITIS WITHOUT COMPLICATION: ICD-10-CM

## 2022-06-02 DIAGNOSIS — E55.9 VITAMIN D DEFICIENCY: ICD-10-CM

## 2022-06-02 PROBLEM — 444548001: Status: ACTIVE | Noted: 2020-09-30

## 2022-06-02 PROCEDURE — 99213 OFFICE O/P EST LOW 20 MIN: CPT | Performed by: INTERNAL MEDICINE

## 2022-06-02 RX ORDER — INFLIXIMAB 100 MG/10ML
INJECTION, POWDER, LYOPHILIZED, FOR SOLUTION INTRAVENOUS
Qty: 0 | Refills: 0 | Status: ON HOLD | COMMUNITY
Start: 1900-01-01

## 2022-06-02 RX ORDER — INFLIXIMAB 100 MG/10ML
AS DIRECTED INJECTION, POWDER, LYOPHILIZED, FOR SOLUTION INTRAVENOUS
Qty: 100 MILLIGRAMS | Refills: 6 | Status: ACTIVE | COMMUNITY

## 2022-06-02 RX ORDER — ADALIMUMAB 40MG/0.8ML
INJECT 0.8 MILLILITER BY SUBCUTANEOUS ROUTE EVERY 2 WEEKS FOR 90 DAYS KIT SUBCUTANEOUS
Qty: 6 | Refills: 1 | Status: ON HOLD | COMMUNITY
Start: 2017-10-18

## 2022-06-02 RX ORDER — FAMOTIDINE 20 MG/1
TAKE 1 TABLET (20 MG) BY ORAL ROUTE ONCE DAILY AT BEDTIME FOR 30 DAYS TABLET, FILM COATED ORAL 1
Qty: 30 | Refills: 2 | Status: ACTIVE | COMMUNITY
Start: 2017-08-11

## 2022-06-02 NOTE — HPI-TODAY'S VISIT:
10/18/17: Patient's repeat C. difficile testing on 9/7/17 was negative. He did not undergo an EGD, but he feels better from that standpoint. He is still having 2-3 loose stools/day without blood or mucus. No cramping. We discussed increasing his Humira to weekly dosing for better control of symptoms. He also complains of hand and leg stiffness but not actual joint pain or swelling. This is new.   4/11/18: Patient returns to my clinic for reevaluation of his ulcerative colitis. Because of upper respiratory infections (for which he eventually got antibiotics), he has been unable to schedule his repeat colonoscopy to evaluate for mucosal healing. In February 2018, he received a Z-david followed by cefdinir along with prednisone. He apparently had a reaction to one of the antibiotics causing abdominal cramping and a rash. His stools are still loose but non-bloody; he has 2-3 BMs/day. Has episodic palpitations and night sweats. Still taking Enteragam/VSL #3 and is tapering off his prednisone. Of note, he had an episode of atrial fibrillation that spontaneously resolved - he has been evaluated by cardiology. No need for anticoagulation. Labs from 4/9/18 were reviewed: Hgb 8.0/Hct 27.8 (MCV 65), Plt 824, ESR 30, CRP 40.1, vit D 16.3 6/26/18: Patient returns for follow-up after receiving his induction doses of Remicade. Feeling more energy. Less bowel movements (still loose). No cramping. No blood.  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 Patient feels well today after receiving his Remicade infusion recently. Minimal cramping. No blood. Stools not quite formed ([place] 5 to 7, closer to 5) but only 2-3 BMs/day. Has some stiffness of hands and back but no true arthritis.   1/30/19: Patient returns for follow-up. He has a sinus infection and finally went to his ENT after symptoms had been unimproved for over a month. He is currently on Medrol and antibiotics - his sinuses are better. No apparent GI problems - he has 1 BM/day of relatively normal consistency. No blood. Still on Remicade - holding that while he treats his sinus infection. Will restart when feeling better. On vitamin D and iron.   5/28/19: Patient returns for follow-up of UC. Restarted his Remicade infusions - last one done on 4/25/19. Still takes VSL #3 DS. Doing well - has one BM/day. No blood or cramping. Only concerns are some inconsistency with the stool (somewhat loose) and dyspnea when climbing stairs. He has gained weight - suspect some element of deconditioning.   6/12/19:  pt comes in for recent hematochezia with dropping hct.   notes recent issues with brown stool with brb on it.  he also notes labs done 2 weeks ago with hgb down from 12.9 --> 12.3.  he notes mostwly normal stools with no urgency.  he has 1 bm every 1-2 days.  he notes some brb on the stool. no abd pain.  no rectal pain.  no n/v.  good appetite.  no weight loss.  no f/c.  notes some thinner stools.  no other complaints.   8/8/19: Patient presents for follow-up. He is concerned about anemia - his ferritin was 9 when checked by Dr. Jack. Iron infusion was ordered, and he received his first infusion last week and another one this week. Has some dyspnea when he walks up stairs or gets up from lying/sitting down. Has gained some weight - suspect some deconditioning, but anemia may be playing a small role. He has bouts of loose stools every few weeks - possibly sees small amounts of blood. Overall, doing well.   11/8/19: Patient presents for follow-up of his ulcerative colitis. At his last visit, I checked labs, and his blood counts and iron studies/ferritin were normal (hgb 13.8 g/dL, ferritin > 300). Still has low vitamin D level - on vitamin D supplement. Last colonoscopy was on 4/30/18 - showed severe disease but unchanged. He is doing well today - no pain (except for some gas after eating cabbage). No blood in stool or diarrhea.  3/16/20: Patient presents for follow-up. Doing well on Remicade. He has had 2 to 3 episodes of cramping, but this occurred when he ate a lot of sweets after dieting. He has not seen any blood in stool. He has felt his heart racing, and his HR is 100 today. Will check labs.  9/30/20:  Patient returns for reevaluation of his UC.  He mentioned that he still has episodic cramping after eating a lot of sweets, but this only happened 3 times since the beginning of the year.  No bloody diarrhea or extraintestinal manifestations of his disease.  Overall doing well.  10/1/21:  Patient presents for reevaluation of is ulcerative pancolitis.  Doing well on Remicade - last infusion was on 8/5/21.  I performed a colonoscopy on 8/10/21 that showed lots of pseudopolyps but no significant inflammation.  He has 1-2 solid bowel movements daily without blood.  He has some anal discomfort for which he uses Preparation PAUL Gutierrez noted.  6/2/22:  Patient presents for reevaluation of his ulcerative pancolitis.  He is doing very well these days on Remicade 5 mg/kg every 8 weeks; there was a delay since December 2021 in getting his infusions covered by insurance.  While not receiving the drug, he has had no symptoms or signs of a flare.  He has 1-2 BMs daily without blood/mucus or abdominal cramping.

## 2022-06-06 ENCOUNTER — OFFICE VISIT (OUTPATIENT)
Dept: URBAN - METROPOLITAN AREA CLINIC 18 | Facility: CLINIC | Age: 52
End: 2022-06-06

## 2022-06-07 ENCOUNTER — TELEPHONE ENCOUNTER (OUTPATIENT)
Dept: URBAN - METROPOLITAN AREA CLINIC 18 | Facility: CLINIC | Age: 52
End: 2022-06-07

## 2022-06-16 ENCOUNTER — LAB OUTSIDE AN ENCOUNTER (OUTPATIENT)
Dept: URBAN - METROPOLITAN AREA CLINIC 19 | Facility: CLINIC | Age: 52
End: 2022-06-16

## 2022-06-17 LAB
A/G RATIO: 1.6
ALBUMIN: 4.2
ALKALINE PHOSPHATASE: 82
ALT (SGPT): 29
AST (SGOT): 25
BILIRUBIN, TOTAL: 0.3
BUN/CREATININE RATIO: 23
BUN: 19
C-REACTIVE PROTEIN, QUANT: 3
CALCIUM: 9
CARBON DIOXIDE, TOTAL: 23
CHLORIDE: 102
CREATININE: 0.82
EGFR: 106
GLOBULIN, TOTAL: 2.7
GLUCOSE: 120
HEMATOCRIT: 43.1
HEMOGLOBIN: 14
MCH: 28.5
MCHC: 32.5
MCV: 88
NRBC: (no result)
PLATELETS: 330
POTASSIUM: 4.5
PROTEIN, TOTAL: 6.9
RBC: 4.91
RDW: 13.5
SEDIMENTATION RATE-WESTERGREN: 8
SODIUM: 140
VITAMIN D, 25-HYDROXY: 18.4
WBC: 4.1

## 2022-06-27 ENCOUNTER — OFFICE VISIT (OUTPATIENT)
Dept: URBAN - METROPOLITAN AREA CLINIC 18 | Facility: CLINIC | Age: 52
End: 2022-06-27

## 2022-06-27 ENCOUNTER — TELEPHONE ENCOUNTER (OUTPATIENT)
Dept: URBAN - METROPOLITAN AREA CLINIC 18 | Facility: CLINIC | Age: 52
End: 2022-06-27

## 2022-06-27 RX ORDER — ADALIMUMAB 40MG/0.8ML
INJECT 0.8 MILLILITER BY SUBCUTANEOUS ROUTE EVERY 2 WEEKS FOR 90 DAYS KIT SUBCUTANEOUS
Qty: 6 | Refills: 1 | Status: ON HOLD | COMMUNITY
Start: 2017-10-18

## 2022-06-27 RX ORDER — INFLIXIMAB 100 MG/10ML
INJECTION, POWDER, LYOPHILIZED, FOR SOLUTION INTRAVENOUS
Qty: 0 | Refills: 0 | Status: ON HOLD | COMMUNITY
Start: 1900-01-01

## 2022-06-27 RX ORDER — FAMOTIDINE 20 MG/1
TAKE 1 TABLET (20 MG) BY ORAL ROUTE ONCE DAILY AT BEDTIME FOR 30 DAYS TABLET, FILM COATED ORAL 1
Qty: 30 | Refills: 2 | Status: ACTIVE | COMMUNITY
Start: 2017-08-11

## 2022-06-27 RX ORDER — INFLIXIMAB 100 MG/10ML
AS DIRECTED INJECTION, POWDER, LYOPHILIZED, FOR SOLUTION INTRAVENOUS
Qty: 100 MILLIGRAMS | Refills: 6 | Status: ACTIVE | COMMUNITY

## 2022-07-08 ENCOUNTER — OFFICE VISIT (OUTPATIENT)
Dept: URBAN - METROPOLITAN AREA CLINIC 79 | Facility: CLINIC | Age: 52
End: 2022-07-08

## 2022-08-01 ENCOUNTER — OFFICE VISIT (OUTPATIENT)
Dept: URBAN - METROPOLITAN AREA CLINIC 18 | Facility: CLINIC | Age: 52
End: 2022-08-01

## 2022-08-22 ENCOUNTER — OFFICE VISIT (OUTPATIENT)
Dept: URBAN - METROPOLITAN AREA CLINIC 18 | Facility: CLINIC | Age: 52
End: 2022-08-22

## 2022-12-02 ENCOUNTER — OFFICE VISIT (OUTPATIENT)
Dept: URBAN - METROPOLITAN AREA CLINIC 19 | Facility: CLINIC | Age: 52
End: 2022-12-02

## 2023-02-21 ENCOUNTER — TELEPHONE ENCOUNTER (OUTPATIENT)
Dept: URBAN - METROPOLITAN AREA CLINIC 19 | Facility: CLINIC | Age: 53
End: 2023-02-21

## 2023-02-21 RX ORDER — HYDROCORTISONE ACETATE 25 MG/1
1 SUPPOSITORY SUPPOSITORY RECTAL ONCE A DAY
Qty: 30 | Refills: 1 | OUTPATIENT
Start: 2023-02-22 | End: 2023-04-23